# Patient Record
Sex: FEMALE | Race: WHITE | HISPANIC OR LATINO | Employment: PART TIME | URBAN - METROPOLITAN AREA
[De-identification: names, ages, dates, MRNs, and addresses within clinical notes are randomized per-mention and may not be internally consistent; named-entity substitution may affect disease eponyms.]

---

## 2017-11-06 ENCOUNTER — ALLSCRIPTS OFFICE VISIT (OUTPATIENT)
Dept: OTHER | Facility: OTHER | Age: 22
End: 2017-11-06

## 2017-11-06 PROCEDURE — 88305 TISSUE EXAM BY PATHOLOGIST: CPT | Performed by: DERMATOLOGY

## 2017-11-06 PROCEDURE — 88312 SPECIAL STAINS GROUP 1: CPT | Performed by: DERMATOLOGY

## 2017-11-07 NOTE — PROGRESS NOTES
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  Patient with normal childbirth recently      Assessment  Assessed    1  Alopecia (704 00) (L65 9)   2  Screening for skin condition (V82 0) (Z13 89)    Past Medical History    The past medical history was reviewed  Surgical History    Surgical History reviewed      Family History  Mother    1  Family history of Hair loss   2  Family history of Vitiligo  Father    3  Family history of Hair loss  Grandfather    4  Family history of Hair loss  Family History Reviewed- Derm:   Family History was reviewed      Social History  Problems    · Never a smoker  The social history was reviewed      Current Meds   1  Biotin TABS; Therapy: (Recorded:06Nov2017) to Recorded   2  Multi-Vitamins TABS; Therapy: (13 [phone]) to Recorded    Review of Systems    Constitutional: Denies constitutional symptoms  Eyes: Denies eye symptoms  ENT:  denies ear symptoms, nasal symptoms, mouth or throat symptoms  Cardiovascular: Denies cardiovascular symptoms  Respiratory: Denies respiratory symptoms  Gastrointestinal: Denies gastrointestinal symptoms  Musculoskeletal: Denies musculoskeletal symptoms  Integumentary: Denies symptoms other than stated above  Neurological: Denies neurologic symptoms  Psychiatric: Denies psychiatric symptoms  Endocrine: Denies endocrine symptoms  Hematologic/Lymphatic: Denies hematologic symptoms  Allergies  Medication    1  Amoxicillin CAPS    Physical Exam    Constitutional   General appearance: Appears healthy and well developed  Lymphatic   No visible disturbance  Musculoskeletal   Digits and nails: No clubbing, cyanosis or edema   Cutaneous and nail exam normal  Skin   Scalp skin texture and hair distribution: Abnormal     Head: Abnormal     Neuro/Psych   Alert and oriented x 3  Displays comfort and cooperation during encounterl  Affect is normal     Finding Diffuse thinning of hair noted negative hair pull involvement of the entire scalp  Procedure    Procedure: skin biopsy  Indications for the procedure include delineation of rash  Risks, benefits, alternatives, infection risk, bleeding risk, risk of allergic reaction and risk of scarring were discussed with the patient--   verbal consent was obtained prior to the procedure  Procedure Note:   Anesthesia: 1 ml of lidocaine 1% with epinephrine  The lesion was located on the Right parietal scalp  The patient was prepped using alcohol  a punch biopsy of the lesion was taken  The hemostasis of the wound was achieved with aluminum chloride  The cutaneous layer was closed with  -0 prolene suture(s)  Dressing: The wound was cleaned and petroleum jelly was applied  Specimen: the excised lesion was place in buffered formalin and sent for pathology  Post-Procedure:   Patient Status: the patient tolerated the procedure well  Complications: there were no complications  Follow-up in the office in week(s) for suture removal--   patient will be called in event skin cancer is found  -- Patient can call the office in 7-10 days for results if not contacted  Plan  Alopecia    · Wound care as instructed ; Status:Complete;   Done: [id number]   · Follow-up visit in 1 week Evaluation and Treatment  Follow-up  Status: Hold For -  Scheduling  Requested for: [id number]    Discussion/Summary    Difficult to ascertain the etiology of her hair loss be an autoimmune or pattern type hair loss  However since the area of hair loss appears so diffuse elected to go ahead biopsy to see if we can better define this process  We also discussed potential getting some blood work at her next visit as well        Future Appointments    Date/Time Provider Specialty Site   11/13/2017 03:30 PM Photo Adelia Moore, Nurse Schedule  [facility]     Signatures   Electronically signed by : MARSHA Esquivel ; Nov 6 2017  1:35PM EST                       (Author)

## 2017-11-08 ENCOUNTER — LAB REQUISITION (OUTPATIENT)
Dept: LAB | Facility: HOSPITAL | Age: 22
End: 2017-11-08
Payer: COMMERCIAL

## 2017-11-08 DIAGNOSIS — L65.9 NONSCARRING HAIR LOSS: ICD-10-CM

## 2017-11-13 ENCOUNTER — APPOINTMENT (OUTPATIENT)
Dept: LAB | Facility: CLINIC | Age: 22
End: 2017-11-13
Payer: COMMERCIAL

## 2017-11-13 DIAGNOSIS — Z13.89 ENCOUNTER FOR SCREENING FOR OTHER DISORDER: ICD-10-CM

## 2017-11-13 DIAGNOSIS — L65.9 NONSCARRING HAIR LOSS: ICD-10-CM

## 2017-11-13 LAB
ERYTHROCYTE [DISTWIDTH] IN BLOOD BY AUTOMATED COUNT: 13.8 % (ref 11.6–15.1)
HCT VFR BLD AUTO: 37.9 % (ref 34.8–46.1)
HGB BLD-MCNC: 12.3 G/DL (ref 11.5–15.4)
MCH RBC QN AUTO: 26.6 PG (ref 26.8–34.3)
MCHC RBC AUTO-ENTMCNC: 32.5 G/DL (ref 31.4–37.4)
MCV RBC AUTO: 82 FL (ref 82–98)
PLATELET # BLD AUTO: 259 THOUSANDS/UL (ref 149–390)
PMV BLD AUTO: 12.2 FL (ref 8.9–12.7)
RBC # BLD AUTO: 4.63 MILLION/UL (ref 3.81–5.12)
TSH SERPL DL<=0.05 MIU/L-ACNC: 2.1 UIU/ML (ref 0.46–3.98)
WBC # BLD AUTO: 4.69 THOUSAND/UL (ref 4.31–10.16)

## 2017-11-13 PROCEDURE — 85027 COMPLETE CBC AUTOMATED: CPT

## 2017-11-13 PROCEDURE — 84443 ASSAY THYROID STIM HORMONE: CPT

## 2017-11-13 PROCEDURE — 36415 COLL VENOUS BLD VENIPUNCTURE: CPT

## 2017-11-14 NOTE — PROCEDURES
Chief Complaint  suture removal      History of Present Illness  HPI- Derm: 26-year-old female presents for follow-up secondary to suture removal from previous biopsy site for her alopecia      Current Meds   1  Biotin TABS; Therapy: (Recorded:06Nov2017) to Recorded   2  Multi-Vitamins TABS; Therapy: (Recorded:06Nov2017) to Recorded    Allergies    1  Amoxicillin CAPS    Physical Exam   Constitutional  General appearance: Appears healthy and well developed  Lymphatic  No visible disturbance  Musculoskeletal  Digits and nails: No clubbing, cyanosis or edema  Cutaneous and nail exam normal    Neuro/Psych  Alert and oriented x 3  Displays comfort and cooperation during encounterl  Affect is normal        Procedure  Procedure: suture removal  The wound was located on the Right parietal scalp  Wound Exam: well healed with no sign of infection  Procedure Note:  Patient Status:  the patient tolerated the procedure well  Complications:  there were no complications  Assessment    1  Alopecia (704 00) (L65 9)   2  Screening for skin condition (V82 0) (Z13 89)    Plan  Alopecia, Screening for skin condition    · (1) CBC/ PLT (NO DIFF); Status:Active; Requested for:13Nov2017;    · (1) TSH; Status:Active; Requested for:13Nov2017;     Discussion/Summary    Biopsy still pending area healing well sutures removed will go ahead and get a TSH along with a CBC to rule out any potential other etiology for hair loss        Signatures   Electronically signed by : MARSHA Hall ; Nov 13 2017 10:43AM EST                       (Author)

## 2017-11-21 ENCOUNTER — GENERIC CONVERSION - ENCOUNTER (OUTPATIENT)
Dept: OTHER | Facility: OTHER | Age: 22
End: 2017-11-21

## 2018-01-10 NOTE — RESULT NOTES
Verified Results  (1) TISSUE EXAM [id number] 02:25PM Minal Currie     Test Name Result Flag Reference   LAB AP CASE REPORT (Report)     Surgical Pathology Report             Case: [id number]                   Authorizing Provider: Dianna Logan MD     Collected:      11/06/2017 1425        Pathologist:      Wilfred Pearson MD        Received:      11/08/2017 1625        Specimen:  Skin, Other, Right parietal scalp   LAB AP FINAL DIAGNOSIS (Report)     A  Skin, Right parietal scalp, punch biopsy:  - Non-scarring alopecia; see note  Note: Vertical sections are examined, there is significant tangential   sectioning artifact  There is no significant inflammation in the dermis,   epidermis or in association with hair follicles  There is no scarring   associated with hair follicles  One telogen hair follicle is seen  Several anagen hair follicles are seen, however, some appear less deeply   seated in the subcutaneous fat (indicative of early miniturization)  In   an appropriate clinical setting, the changes maybe indicative of   androgenic alopecia  Case discussed with Dr Quan John  Interpretation performed at [facility], [address] [place] [address], [address]        Electronically signed by Wilfred Pearson MD on 11/20/2017 at 5:31 PM   LAB AP SURGICAL ADDITIONAL INFORMATION (Report)     All controls performed with the immunohistochemical stains reported above   reacted appropriately  These tests were developed and their performance   characteristics determined by [facility] or   [facility]  They may not be cleared or approved by the [place]  Food and Drug Administration  The FDA has determined that such clearance   or approval is not necessary  These tests are used for clinical purposes  They should not be regarded as investigational or for research   This   laboratory has been approved by [place], designated as a high-complexity   laboratory and is qualified to perform these tests  LAB AP GROSS DESCRIPTION (Report)     A  The specimen is received in formalin, labeled with the patient's name   and hospital number, and is designated skin punch biopsy right parietal   scalp  The specimen consists of a tan portion of skin measuring 0 3 cm in   diameter, excised to a depth of 0 2 cm  The skin surface appears somewhat   erythematous  The margin of resection is inked green  The skin surface is   inked red  Entirely submitted  One cassette  Note: The estimated total formalin fixation time based upon information   provided by the submitting clinician and the standard processing schedule   is over 72 hours  MCrites   LAB AP CLINICAL INFORMATION (Report)     Delineation of rash/non-scarring hair loss  51-year-old female presents secondary to concerns regarding hair loss that has been going on for 6 years patient reports that her hair was normal last when she was 17years old 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

## 2022-03-01 ENCOUNTER — TELEPHONE (OUTPATIENT)
Dept: OBGYN CLINIC | Facility: CLINIC | Age: 27
End: 2022-03-01